# Patient Record
Sex: FEMALE | Race: WHITE | Employment: FULL TIME | URBAN - METROPOLITAN AREA
[De-identification: names, ages, dates, MRNs, and addresses within clinical notes are randomized per-mention and may not be internally consistent; named-entity substitution may affect disease eponyms.]

---

## 2017-01-12 PROCEDURE — 88175 CYTOPATH C/V AUTO FLUID REDO: CPT | Performed by: OBSTETRICS & GYNECOLOGY

## 2017-01-17 PROBLEM — M75.122 COMPLETE TEAR OF LEFT ROTATOR CUFF: Status: ACTIVE | Noted: 2017-01-17

## 2017-01-17 PROBLEM — M75.112 INCOMPLETE TEAR OF LEFT ROTATOR CUFF: Status: ACTIVE | Noted: 2017-01-17

## 2017-02-01 ENCOUNTER — ANESTHESIA EVENT (OUTPATIENT)
Dept: SURGERY | Facility: HOSPITAL | Age: 47
End: 2017-02-01
Payer: COMMERCIAL

## 2017-02-02 ENCOUNTER — SURGERY (OUTPATIENT)
Age: 47
End: 2017-02-02

## 2017-02-02 ENCOUNTER — HOSPITAL ENCOUNTER (OUTPATIENT)
Facility: HOSPITAL | Age: 47
Setting detail: HOSPITAL OUTPATIENT SURGERY
Discharge: HOME | End: 2017-02-02
Attending: ORTHOPAEDIC SURGERY | Admitting: ORTHOPAEDIC SURGERY
Payer: COMMERCIAL

## 2017-02-02 ENCOUNTER — ANESTHESIA (OUTPATIENT)
Dept: SURGERY | Facility: HOSPITAL | Age: 47
End: 2017-02-02
Payer: COMMERCIAL

## 2017-02-02 VITALS
HEIGHT: 65 IN | HEART RATE: 57 BPM | RESPIRATION RATE: 18 BRPM | DIASTOLIC BLOOD PRESSURE: 74 MMHG | WEIGHT: 164 LBS | BODY MASS INDEX: 27.32 KG/M2 | TEMPERATURE: 98 F | OXYGEN SATURATION: 100 % | SYSTOLIC BLOOD PRESSURE: 109 MMHG

## 2017-02-02 LAB
POCT LOT NUMBER: NORMAL
POCT URINE PREGNANCY: NEGATIVE

## 2017-02-02 PROCEDURE — 0LQ20ZZ REPAIR LEFT SHOULDER TENDON, OPEN APPROACH: ICD-10-PCS | Performed by: ORTHOPAEDIC SURGERY

## 2017-02-02 PROCEDURE — 3E0T3BZ INTRODUCTION OF ANESTHETIC AGENT INTO PERIPHERAL NERVES AND PLEXI, PERCUTANEOUS APPROACH: ICD-10-PCS | Performed by: ANESTHESIOLOGY

## 2017-02-02 PROCEDURE — 81025 URINE PREGNANCY TEST: CPT | Performed by: ORTHOPAEDIC SURGERY

## 2017-02-02 PROCEDURE — 76942 ECHO GUIDE FOR BIOPSY: CPT | Performed by: ORTHOPAEDIC SURGERY

## 2017-02-02 RX ORDER — HYDROCODONE BITARTRATE AND ACETAMINOPHEN 5; 325 MG/1; MG/1
2 TABLET ORAL AS NEEDED
Status: COMPLETED | OUTPATIENT
Start: 2017-02-02 | End: 2017-02-02

## 2017-02-02 RX ORDER — MIDAZOLAM HYDROCHLORIDE 1 MG/ML
1 INJECTION INTRAMUSCULAR; INTRAVENOUS EVERY 5 MIN PRN
Status: DISCONTINUED | OUTPATIENT
Start: 2017-02-02 | End: 2017-02-02

## 2017-02-02 RX ORDER — HYDROMORPHONE HYDROCHLORIDE 1 MG/ML
0.4 INJECTION, SOLUTION INTRAMUSCULAR; INTRAVENOUS; SUBCUTANEOUS EVERY 5 MIN PRN
Status: DISCONTINUED | OUTPATIENT
Start: 2017-02-02 | End: 2017-02-02

## 2017-02-02 RX ORDER — SCOLOPAMINE TRANSDERMAL SYSTEM 1 MG/1
1 PATCH, EXTENDED RELEASE TRANSDERMAL ONCE
Status: DISCONTINUED | OUTPATIENT
Start: 2017-02-02 | End: 2017-02-02

## 2017-02-02 RX ORDER — SODIUM CHLORIDE, SODIUM LACTATE, POTASSIUM CHLORIDE, CALCIUM CHLORIDE 600; 310; 30; 20 MG/100ML; MG/100ML; MG/100ML; MG/100ML
INJECTION, SOLUTION INTRAVENOUS CONTINUOUS
Status: DISCONTINUED | OUTPATIENT
Start: 2017-02-02 | End: 2017-02-02

## 2017-02-02 RX ORDER — METOCLOPRAMIDE HYDROCHLORIDE 5 MG/ML
10 INJECTION INTRAMUSCULAR; INTRAVENOUS AS NEEDED
Status: DISCONTINUED | OUTPATIENT
Start: 2017-02-02 | End: 2017-02-02

## 2017-02-02 RX ORDER — SCOLOPAMINE TRANSDERMAL SYSTEM 1 MG/1
PATCH, EXTENDED RELEASE TRANSDERMAL
Status: DISCONTINUED
Start: 2017-02-02 | End: 2017-02-02

## 2017-02-02 RX ORDER — HYDROCODONE BITARTRATE AND ACETAMINOPHEN 5; 325 MG/1; MG/1
1 TABLET ORAL AS NEEDED
Status: COMPLETED | OUTPATIENT
Start: 2017-02-02 | End: 2017-02-02

## 2017-02-02 RX ORDER — NALOXONE HYDROCHLORIDE 0.4 MG/ML
80 INJECTION, SOLUTION INTRAMUSCULAR; INTRAVENOUS; SUBCUTANEOUS AS NEEDED
Status: DISCONTINUED | OUTPATIENT
Start: 2017-02-02 | End: 2017-02-02

## 2017-02-02 RX ORDER — MEPERIDINE HYDROCHLORIDE 25 MG/ML
12.5 INJECTION INTRAMUSCULAR; INTRAVENOUS; SUBCUTANEOUS AS NEEDED
Status: DISCONTINUED | OUTPATIENT
Start: 2017-02-02 | End: 2017-02-02

## 2017-02-02 RX ORDER — DIPHENHYDRAMINE HYDROCHLORIDE 50 MG/ML
12.5 INJECTION INTRAMUSCULAR; INTRAVENOUS AS NEEDED
Status: DISCONTINUED | OUTPATIENT
Start: 2017-02-02 | End: 2017-02-02

## 2017-02-02 RX ORDER — ONDANSETRON 2 MG/ML
4 INJECTION INTRAMUSCULAR; INTRAVENOUS AS NEEDED
Status: DISCONTINUED | OUTPATIENT
Start: 2017-02-02 | End: 2017-02-02

## 2017-02-02 RX ORDER — DEXAMETHASONE SODIUM PHOSPHATE 4 MG/ML
4 VIAL (ML) INJECTION AS NEEDED
Status: DISCONTINUED | OUTPATIENT
Start: 2017-02-02 | End: 2017-02-02

## 2017-02-02 NOTE — OPERATIVE REPORT
[facility]    PATIENT'S NAME: Tre Quesadaoter   ATTENDING PHYSICIAN: Hafsa Thompson M.D. OPERATING PHYSICIAN: Hafsa Thompson M.D.    PATIENT ACCOUNT#:   [de-identified]    LOCATION:  PACU [facility] PACU 6 EDWP 10  MEDICAL RECORD #:   [id number]       DATE OF minor acromioplasty was performed using the bur. Attention was turned to the cuff. There was a near full-thickness tear at the supraspinatus region.   This was incised using the knife, and the undersurface tearing debrided using the knife and rongeur back

## 2017-02-02 NOTE — BRIEF OP NOTE
[address] [facility]  Brief Op Note     Marino Freedman Location: OR   [facility] [id number] N [id number]   Admission Date 2/2/2017 Operation Date 2/2/2017   Attending Physician Dave Jacinto MD Operating Physician Pee Mccracken MD       Pre-Operati

## 2017-02-02 NOTE — INTERVAL H&P NOTE
Pre-op Diagnosis: ROTATOR CUFF TEAR LEFT SHOULDER      The above referenced H&P was reviewed by John Sultana MD on 2/2/2017, the patient was examined and no significant changes have occurred in the patient's condition since the H&P was performed.   I di

## 2017-02-02 NOTE — H&P
ORTHO VISIT NOTE      DATE OF VISIT: 01/17/2017                                                              [facility]      CHIEF COMPLAINT: Left shoulder pain.  Skinny Ramirez was referred for consultation by Dr. Esdras Rowan not a full-thickness but as described high-grade partial-thickness.       IMPRESSION: Left high-grade partial-thickness rotator cuff tear with failed conservative care.       PLAN: Discussed the diagnosis with the patient.  With her extensive conservative c

## 2017-02-02 NOTE — ANESTHESIA POSTPROCEDURE EVALUATION
[address] Patient Status:  Hospital Outpatient Surgery   Age/Gender 40year old female MRN [id number]   Location [facility] Attending Linette Cornell MD   Hosp Day # 0 PCP Konstantin Marshall MD

## 2017-02-02 NOTE — ANESTHESIA PREPROCEDURE EVALUATION
PRE-OP EVALUATION    Patient Name: Marino Freedman    Pre-op Diagnosis: ROTATOR CUFF TEAR LEFT SHOULDER      Procedure(s):  OPEN ROTATOR CUFF REPAIR LEFT SHOULDER     Surgeon(s) and Role:     Prashanth Cabrera MD - Primary    Pre-op vitals reviewed. WBC 7.21 11/14/2016   RBC 3.96 11/14/2016   HGB 12.0 11/14/2016   HCT 35.9 11/14/2016   MCV 90.7 11/14/2016   MCH 30.3 11/14/2016   MCHC 33.4 11/14/2016   RDW 12.2 11/14/2016    11/14/2016       Lab Results  Component Value Date    11/14/201

## 2017-02-10 PROBLEM — Z98.890 STATUS POST ROTATOR CUFF REPAIR: Status: ACTIVE | Noted: 2017-02-10

## 2017-05-18 PROCEDURE — 82607 VITAMIN B-12: CPT | Performed by: FAMILY MEDICINE

## 2017-05-18 PROCEDURE — 82746 ASSAY OF FOLIC ACID SERUM: CPT | Performed by: FAMILY MEDICINE

## 2017-06-26 PROCEDURE — 87086 URINE CULTURE/COLONY COUNT: CPT | Performed by: FAMILY MEDICINE

## 2017-09-27 ENCOUNTER — LAB ENCOUNTER (OUTPATIENT)
Dept: LAB | Age: 47
End: 2017-09-27
Attending: OTOLARYNGOLOGY
Payer: COMMERCIAL

## 2017-09-27 DIAGNOSIS — E55.9 AVITAMINOSIS D: ICD-10-CM

## 2017-09-27 DIAGNOSIS — E89.0 POSTSURGICAL HYPOTHYROIDISM: Primary | ICD-10-CM

## 2017-09-27 LAB
FREE T4: 1.2 NG/DL (ref 0.9–1.8)
T3FREE SERPL-MCNC: 2.35 PG/ML (ref 2.3–4.2)
TSI SER-ACNC: 0.92 MIU/ML (ref 0.35–5.5)

## 2017-09-27 PROCEDURE — 86376 MICROSOMAL ANTIBODY EACH: CPT

## 2017-09-27 PROCEDURE — 84481 FREE ASSAY (FT-3): CPT

## 2017-09-27 PROCEDURE — 84443 ASSAY THYROID STIM HORMONE: CPT

## 2017-09-27 PROCEDURE — 86800 THYROGLOBULIN ANTIBODY: CPT

## 2017-09-27 PROCEDURE — 84439 ASSAY OF FREE THYROXINE: CPT

## 2017-09-27 PROCEDURE — 82306 VITAMIN D 25 HYDROXY: CPT

## 2017-09-28 LAB
25-HYDROXYVITAMIN D (TOTAL): 29.9 NG/ML (ref 30–100)
ANTI-THYROGLOBULIN: <15 U/ML (ref ?–60)
ANTI-THYROPEROXIDASE: <28 U/ML (ref ?–60)

## 2018-02-15 PROBLEM — Z98.890 STATUS POST ROTATOR CUFF REPAIR: Status: RESOLVED | Noted: 2017-02-10 | Resolved: 2018-02-15

## 2018-02-15 PROBLEM — M75.122 COMPLETE TEAR OF LEFT ROTATOR CUFF: Status: RESOLVED | Noted: 2017-01-17 | Resolved: 2018-02-15

## 2018-06-26 ENCOUNTER — LAB ENCOUNTER (OUTPATIENT)
Dept: LAB | Age: 48
End: 2018-06-26
Attending: OTOLARYNGOLOGY
Payer: COMMERCIAL

## 2018-06-26 DIAGNOSIS — E55.9 AVITAMINOSIS D: ICD-10-CM

## 2018-06-26 DIAGNOSIS — E89.0 POSTSURGICAL HYPOTHYROIDISM: Primary | ICD-10-CM

## 2018-06-26 PROCEDURE — 82728 ASSAY OF FERRITIN: CPT

## 2018-06-26 PROCEDURE — 80053 COMPREHEN METABOLIC PANEL: CPT

## 2018-06-26 PROCEDURE — 84439 ASSAY OF FREE THYROXINE: CPT

## 2018-06-26 PROCEDURE — 84443 ASSAY THYROID STIM HORMONE: CPT

## 2018-06-26 PROCEDURE — 84481 FREE ASSAY (FT-3): CPT

## 2018-06-26 PROCEDURE — 82306 VITAMIN D 25 HYDROXY: CPT

## 2018-10-16 ENCOUNTER — LAB ENCOUNTER (OUTPATIENT)
Dept: LAB | Age: 48
End: 2018-10-16
Attending: OTOLARYNGOLOGY
Payer: COMMERCIAL

## 2018-10-16 DIAGNOSIS — E55.9 AVITAMINOSIS D: ICD-10-CM

## 2018-10-16 DIAGNOSIS — E89.0 POSTSURGICAL HYPOTHYROIDISM: Primary | ICD-10-CM

## 2018-10-16 PROCEDURE — 84439 ASSAY OF FREE THYROXINE: CPT

## 2018-10-16 PROCEDURE — 84481 FREE ASSAY (FT-3): CPT

## 2018-10-16 PROCEDURE — 86800 THYROGLOBULIN ANTIBODY: CPT

## 2018-10-16 PROCEDURE — 82306 VITAMIN D 25 HYDROXY: CPT

## 2018-10-16 PROCEDURE — 84443 ASSAY THYROID STIM HORMONE: CPT

## 2018-10-16 PROCEDURE — 86376 MICROSOMAL ANTIBODY EACH: CPT

## 2018-10-18 PROCEDURE — 87624 HPV HI-RISK TYP POOLED RSLT: CPT | Performed by: OBSTETRICS & GYNECOLOGY

## 2018-10-18 PROCEDURE — 88175 CYTOPATH C/V AUTO FLUID REDO: CPT | Performed by: OBSTETRICS & GYNECOLOGY

## 2019-06-06 ENCOUNTER — LAB ENCOUNTER (OUTPATIENT)
Dept: LAB | Age: 49
End: 2019-06-06
Attending: OTOLARYNGOLOGY
Payer: COMMERCIAL

## 2019-06-06 DIAGNOSIS — E55.9 AVITAMINOSIS D: ICD-10-CM

## 2019-06-06 DIAGNOSIS — E89.0 POSTSURGICAL HYPOTHYROIDISM: Primary | ICD-10-CM

## 2019-06-06 PROCEDURE — 84481 FREE ASSAY (FT-3): CPT

## 2019-06-06 PROCEDURE — 84443 ASSAY THYROID STIM HORMONE: CPT

## 2019-06-06 PROCEDURE — 82306 VITAMIN D 25 HYDROXY: CPT

## 2019-06-06 PROCEDURE — 84439 ASSAY OF FREE THYROXINE: CPT

## 2019-06-21 ENCOUNTER — LAB ENCOUNTER (OUTPATIENT)
Dept: LAB | Age: 49
End: 2019-06-21
Payer: COMMERCIAL

## 2019-06-21 DIAGNOSIS — Z00.00 ROUTINE GENERAL MEDICAL EXAMINATION AT A HEALTH CARE FACILITY: Primary | ICD-10-CM

## 2019-06-21 PROCEDURE — 86850 RBC ANTIBODY SCREEN: CPT

## 2019-06-21 PROCEDURE — 86901 BLOOD TYPING SEROLOGIC RH(D): CPT

## 2019-06-21 PROCEDURE — 86140 C-REACTIVE PROTEIN: CPT

## 2019-06-21 PROCEDURE — 86870 RBC ANTIBODY IDENTIFICATION: CPT

## 2019-06-21 PROCEDURE — 85652 RBC SED RATE AUTOMATED: CPT

## 2019-06-21 PROCEDURE — 86900 BLOOD TYPING SEROLOGIC ABO: CPT

## 2019-06-21 PROCEDURE — 82728 ASSAY OF FERRITIN: CPT

## 2019-06-21 PROCEDURE — 81001 URINALYSIS AUTO W/SCOPE: CPT

## 2020-03-19 ENCOUNTER — OFFICE VISIT (OUTPATIENT)
Dept: SURGERY | Facility: CLINIC | Age: 50
End: 2020-03-19
Payer: COMMERCIAL

## 2020-03-19 VITALS — WEIGHT: 150 LBS | BODY MASS INDEX: 24.99 KG/M2 | TEMPERATURE: 99 F | HEIGHT: 65 IN

## 2020-03-19 DIAGNOSIS — Z80.0 FAMILY HISTORY OF COLON CANCER: ICD-10-CM

## 2020-03-19 DIAGNOSIS — K60.0 ACUTE POSTERIOR ANAL FISSURE: Primary | ICD-10-CM

## 2020-03-19 DIAGNOSIS — L29.0 PRURITUS ANI: ICD-10-CM

## 2020-03-19 DIAGNOSIS — K59.09 OTHER CONSTIPATION: ICD-10-CM

## 2020-03-19 DIAGNOSIS — K60.0 ACUTE ANTERIOR ANAL FISSURE: ICD-10-CM

## 2020-03-19 DIAGNOSIS — K64.5 THROMBOSED EXTERNAL HEMORRHOID: ICD-10-CM

## 2020-03-19 PROCEDURE — 99243 OFF/OP CNSLTJ NEW/EST LOW 30: CPT | Performed by: COLON & RECTAL SURGERY

## 2020-03-19 NOTE — PATIENT INSTRUCTIONS
The patient presents today in consultation of Dr. Dimitry Rosario for rectal pain. The patient states that for approximately 7 months she has been dealing with this rectal pain. She states that she has a long history of constipation.   She states her constip posterior anal fissure as well as a left lateral external hemorrhoid. There are no visible fistula, or abscesses. I discussed with the patient at today's visit that she does have an anal fissure in both the anterior and posterior midline position.   I r

## 2020-03-19 NOTE — H&P
New Patient Visit Note       Active Problems      1. Acute posterior anal fissure    2. Acute anterior anal fissure    3. Thrombosed external hemorrhoid    4. Pruritus ani    5. Other constipation    6.  Family history of colon cancer        Chief Complaint patient has had a colonoscopy 2 years ago by Dr. Kathy Sawyer and the colonoscopy was found to be normal.  She is a G2, P2 with both her deliveries being vaginal and her children weighing around 6 pounds.     The patient has a past surgical history significant f MAIN OR   • TONSILLECTOMY         The family history and social history have been reviewed by me today. History reviewed. No pertinent family history. Social History    Socioeconomic History      Marital status:        Spouse name: Not on file color change and rash. Neurological: Negative for tremors, syncope and weakness. Hematological: Negative for adenopathy. Does not bruise/bleed easily. Psychiatric/Behavioral: Negative for behavioral problems and sleep disturbance.        Physical Find reveals there to be an anterior and posterior anal fissure as well as a left lateral external hemorrhoid. There are no visible fistula, or abscesses. Lymphadenopathy:        Head (right side): No submental and no submandibular adenopathy present. when she has the bowel movements. The pain is on the left side of her anus. The patient states that she is afraid to have a bowel movement and tends to avoid having a bowel movement secondary to the pain.   She does note a small amount of blood when she w also discussed with patient that she does have evidence of pruritus ani changes to the skin.   A pruritus ani diet was discussed with the patient and she was provided a patient education handout to avoid any coffee, cola, chocolate, tea, beer, tomatoes, ket

## 2020-03-20 ENCOUNTER — MED REC SCAN ONLY (OUTPATIENT)
Dept: SURGERY | Facility: CLINIC | Age: 50
End: 2020-03-20

## 2020-06-08 ENCOUNTER — OFFICE VISIT (OUTPATIENT)
Dept: SURGERY | Facility: CLINIC | Age: 50
End: 2020-06-08
Payer: COMMERCIAL

## 2020-06-08 VITALS
TEMPERATURE: 98 F | DIASTOLIC BLOOD PRESSURE: 74 MMHG | WEIGHT: 152 LBS | SYSTOLIC BLOOD PRESSURE: 122 MMHG | BODY MASS INDEX: 25 KG/M2 | RESPIRATION RATE: 16 BRPM | HEART RATE: 50 BPM

## 2020-06-08 DIAGNOSIS — Z80.0 FAMILY HISTORY OF COLON CANCER: ICD-10-CM

## 2020-06-08 DIAGNOSIS — K59.01 SLOW TRANSIT CONSTIPATION: ICD-10-CM

## 2020-06-08 DIAGNOSIS — K60.0 ACUTE POSTERIOR ANAL FISSURE: Primary | ICD-10-CM

## 2020-06-08 DIAGNOSIS — L29.0 PRURITUS ANI: ICD-10-CM

## 2020-06-08 PROCEDURE — 99213 OFFICE O/P EST LOW 20 MIN: CPT | Performed by: COLON & RECTAL SURGERY

## 2020-06-08 NOTE — PATIENT INSTRUCTIONS
The patient presents today for continued care and evaluation of her anal fissure. The patient states that since her last visit she feels improved. She states that the pain is definitely much better than it was initially.   She is not having severe sharp 3 months for continued care and evaluation of her anal fissure. She should contact our office or return sooner for any new or worsening symptoms.

## 2020-06-08 NOTE — PROGRESS NOTES
Follow Up Visit Note       Active Problems      1. Acute posterior anal fissure    2. Slow transit constipation    3. Pruritus ani    4.  Family history of colon cancer          Chief Complaint   Patient presents with:  Anal Problem: con't care anal fissure was spent in counseling the patient on the above listed diagnoses and treatment options. Allergies  Michael Athens has No Known Allergies.     Past Medical / Surgical / Social / Family History    The past medical and past surgical history have been review Negative for chills, diaphoresis, fatigue, fever and unexpected weight change. HENT: Negative for hearing loss, nosebleeds, sore throat and trouble swallowing. Respiratory: Negative for apnea, cough, shortness of breath and wheezing.     Cardiovascular splenomegaly or hepatomegaly. There is no tenderness. There is no rigidity, no rebound, no guarding, no CVA tenderness, no tenderness at McBurney's point and negative Calderon's sign. No hernia.  Hernia confirmed negative in the ventral area, confirmed negati fissure. The patient states that since her last visit she feels improved. She states that the pain is definitely much better than it was initially. She is not having severe sharp stabbing pain anymore.   She no longer needs to take Advil as she was sulma should contact our office or return sooner for any new or worsening symptoms. No orders of the defined types were placed in this encounter. Imaging & Referrals   None    Follow Up  Return in 3 months (on 9/10/2020).     Cameron Faustin MD

## 2025-03-26 ENCOUNTER — LAB SERVICES (OUTPATIENT)
Dept: LAB | Age: 55
End: 2025-03-26
Attending: NURSE PRACTITIONER

## 2025-03-26 DIAGNOSIS — R11.2 NAUSEA AND VOMITING, UNSPECIFIED VOMITING TYPE: Primary | ICD-10-CM

## 2025-03-26 DIAGNOSIS — R50.9 FEVER, UNSPECIFIED FEVER CAUSE: ICD-10-CM

## 2025-03-26 DIAGNOSIS — R10.9 ABDOMINAL PAIN, UNSPECIFIED ABDOMINAL LOCATION: ICD-10-CM

## 2025-03-26 LAB
ALBUMIN SERPL-MCNC: 3.7 G/DL (ref 3.4–5)
ALBUMIN/GLOB SERPL: 1.1 {RATIO} (ref 1–2.4)
ALP SERPL-CCNC: 41 UNITS/L (ref 45–117)
ALT SERPL-CCNC: 26 UNITS/L
AMORPH SED URNS QL MICRO: PRESENT
ANION GAP SERPL CALC-SCNC: 14 MMOL/L (ref 7–19)
APPEARANCE UR: ABNORMAL
AST SERPL-CCNC: 11 UNITS/L
BACTERIA #/AREA URNS HPF: ABNORMAL /HPF
BASOPHILS # BLD: 0.1 K/MCL (ref 0–0.3)
BASOPHILS NFR BLD: 1 %
BILIRUB SERPL-MCNC: 0.5 MG/DL (ref 0.2–1)
BILIRUB UR QL STRIP: NEGATIVE
BUN SERPL-MCNC: 7 MG/DL (ref 6–20)
BUN/CREAT SERPL: 11 (ref 7–25)
CALCIUM SERPL-MCNC: 8.1 MG/DL (ref 8.4–10.2)
CHLORIDE SERPL-SCNC: 105 MMOL/L (ref 97–110)
CO2 SERPL-SCNC: 26 MMOL/L (ref 21–32)
COLOR UR: YELLOW
CREAT SERPL-MCNC: 0.63 MG/DL (ref 0.51–0.95)
DEPRECATED RDW RBC: 41.6 FL (ref 39–50)
EGFRCR SERPLBLD CKD-EPI 2021: >90 ML/MIN/{1.73_M2}
EOSINOPHIL # BLD: 0.1 K/MCL (ref 0–0.5)
EOSINOPHIL NFR BLD: 2 %
ERYTHROCYTE [DISTWIDTH] IN BLOOD: 12.1 % (ref 11–15)
FASTING DURATION TIME PATIENT: ABNORMAL H
GLOBULIN SER-MCNC: 3.3 G/DL (ref 2–4)
GLUCOSE SERPL-MCNC: 86 MG/DL (ref 70–99)
GLUCOSE UR STRIP-MCNC: ABNORMAL MG/DL
HCT VFR BLD CALC: 40.2 % (ref 36–46.5)
HGB BLD-MCNC: 12.7 G/DL (ref 12–15.5)
HGB UR QL STRIP: ABNORMAL
HYALINE CASTS #/AREA URNS LPF: ABNORMAL /LPF
IMM GRANULOCYTES # BLD AUTO: 0.1 K/MCL (ref 0–0.2)
IMM GRANULOCYTES # BLD: 1 %
KETONES UR STRIP-MCNC: NEGATIVE MG/DL
LEUKOCYTE ESTERASE UR QL STRIP: ABNORMAL
LYMPHOCYTES # BLD: 3.5 K/MCL (ref 1–4)
LYMPHOCYTES NFR BLD: 45 %
MCH RBC QN AUTO: 29.6 PG (ref 26–34)
MCHC RBC AUTO-ENTMCNC: 31.6 G/DL (ref 32–36.5)
MCV RBC AUTO: 93.7 FL (ref 78–100)
MONOCYTES # BLD: 0.7 K/MCL (ref 0.3–0.9)
MONOCYTES NFR BLD: 8 %
MUCOUS THREADS URNS QL MICRO: PRESENT
NEUTROPHILS # BLD: 3.3 K/MCL (ref 1.8–7.7)
NEUTROPHILS NFR BLD: 43 %
NITRITE UR QL STRIP: NEGATIVE
NRBC BLD MANUAL-RTO: 0 /100 WBC
PH UR STRIP: 6 [PH] (ref 5–7)
PLATELET # BLD AUTO: 343 K/MCL (ref 140–450)
POTASSIUM SERPL-SCNC: 4.7 MMOL/L (ref 3.4–5.1)
PROT SERPL-MCNC: 7 G/DL (ref 6.4–8.2)
PROT UR STRIP-MCNC: 30 MG/DL
RBC # BLD: 4.29 MIL/MCL (ref 4–5.2)
RBC #/AREA URNS HPF: ABNORMAL /HPF
SODIUM SERPL-SCNC: 140 MMOL/L (ref 135–145)
SP GR UR STRIP: >1.03 (ref 1–1.03)
SQUAMOUS #/AREA URNS HPF: ABNORMAL /HPF
UROBILINOGEN UR STRIP-MCNC: 4 MG/DL
WBC # BLD: 7.7 K/MCL (ref 4.2–11)
WBC #/AREA URNS HPF: ABNORMAL /HPF

## 2025-03-26 PROCEDURE — 80053 COMPREHEN METABOLIC PANEL: CPT

## 2025-03-26 PROCEDURE — 81001 URINALYSIS AUTO W/SCOPE: CPT

## 2025-03-26 PROCEDURE — 85025 COMPLETE CBC W/AUTO DIFF WBC: CPT

## (undated) DEVICE — DRAPE,U/SHT,SPLIT,FILM,60X84,STERILE: Brand: MEDLINE

## (undated) DEVICE — ADHESIVE MASTISOL 2/3CC VL

## (undated) DEVICE — GLOVE SURG TRIUMPH SZ 8-1/2

## (undated) DEVICE — SUTURE VICRYL 3-0 CT-2

## (undated) DEVICE — Device

## (undated) DEVICE — SUTURE MONOCRYL 4-0 PS-2

## (undated) DEVICE — SYRINGE 10ML LL TIP

## (undated) DEVICE — VIOLET BRAIDED (POLYGLACTIN 910), SYNTHETIC ABSORBABLE SUTURE: Brand: COATED VICRYL

## (undated) DEVICE — ORTHO CDS-LF: Brand: MEDLINE INDUSTRIES, INC.

## (undated) DEVICE — #15 STERILE STAINLESS BLADE: Brand: STERILE STAINLESS BLADES

## (undated) DEVICE — SPK10022 SCHLEIN POSITIONING KIT: Brand: SPK10022 SCHLEIN POSITIONING KIT

## (undated) DEVICE — 3M™ IOBAN™ 2 ANTIMICROBIAL INCISE DRAPE 6650EZ: Brand: IOBAN™ 2

## (undated) DEVICE — GLOVE ORTHO TRIFLEX SZ 8

## (undated) DEVICE — 3M™ STERI-STRIP™ REINFORCED ADHESIVE SKIN CLOSURES, R1547, 1/2 IN X 4 IN (12 MM X 100 MM), 6 STRIPS/ENVELOPE: Brand: 3M™ STERI-STRIP™

## (undated) DEVICE — GLOVE SURG TRIUMPH SZ 8

## (undated) DEVICE — KENDALL SCD EXPRESS SLEEVES, KNEE LENGTH, MEDIUM: Brand: KENDALL SCD

## (undated) DEVICE — CONVERTORS STOCKINETTE: Brand: CONVERTORS

## (undated) DEVICE — 4.0MM OVAL CARBIDE BUR

## (undated) DEVICE — SOL  .9 1000ML BTL

## (undated) DEVICE — GLOVE SURG SENSICARE SZ 8

## (undated) DEVICE — 3M™ MICROFOAM™ TAPE 1528-4: Brand: 3M™ MICROFOAM™

## (undated) DEVICE — NON-ADHERENT STRIPS,OIL EMULSION: Brand: CURITY

## (undated) DEVICE — MAGNUMWIRE LOOSE NONABSORBABLE                                    SUTURE BLUE-COBRAID WITH 26.5 MM 1/2                                    CIRCLE TAPERED NEEDLE: Brand: MAGNUMWIRE

## (undated) NOTE — IP AVS SNAPSHOT
[facility] [place] [address] [place], [address] ~ [phone]                Discharge Summary   2/2/2017    Douglas Hebert           Admission Information        Provider Department    2/2/2017 Lucrecia Ledesma MD  Pre-Op / · 24 hours after Anesthesia/Sedation    Call the doctor for:  · Elevated Temperature  · Bleeding  · Nausea/Vomiting  · Pain not relieved with pain medication    For life threatening emergencies (unexpected chest pain, difficulty breathing) call 911.     If Recent Hematology Lab Results (cont.)  (Last 3 results in the past 90 days)    Neutrophil % Lymphocyte % Monocyte % Eosinophil % Basophil % Prelim Neut Abs Final Neut Abs Lymphocyte Abso Monocyte Absolu Eosinophil Abso Basophil Absolu    (11/14/16)  44.1 ( returning the survey you will receive in the mail. Thank you! MyChart     Visit [employer]  You can access your [email or web address] to more actively manage your health care and view more details from this visit by going to [email or web address].   If you've

## (undated) NOTE — LETTER
20    Patient: Cary Rodgers  : 11/15/1970 Visit date: 3/19/2020    Dear  Dr. Dianelys Pratt,    Thank you for referring Cary  to my practice. Please find my assessment and plan below.              Assessment   Acute posterior an colonoscopy was found to be normal.  She is a G2, P2 with both her deliveries being vaginal and her children weighing around 6 pounds. The patient has a past surgical history significant for an abdominoplasty, a tubal ligation, and a thyroidectomy.   She

## (undated) NOTE — LETTER
20    Patient: Jennifer Boateng  : 11/15/1970 Visit date: 2020    Dear  Dr. Claudia Gonzalez MD,    Thank you for referring Jennifer Boateng to my practice. Please find my assessment and plan below.         Assessment   Acute posterior anal fissur daily along with her Linzess. This is to help with her constipation and soften up her bowel movements to help prevent continuous tearing and ripping of her fissure. The patient may continue to use the nitroglycerin as needed for the fissure.     I discuss